# Patient Record
Sex: FEMALE | Race: WHITE | Employment: FULL TIME | URBAN - METROPOLITAN AREA
[De-identification: names, ages, dates, MRNs, and addresses within clinical notes are randomized per-mention and may not be internally consistent; named-entity substitution may affect disease eponyms.]

---

## 2018-11-16 ENCOUNTER — OFFICE VISIT (OUTPATIENT)
Dept: URGENT CARE | Facility: URGENT CARE | Age: 23
End: 2018-11-16
Payer: COMMERCIAL

## 2018-11-16 VITALS
OXYGEN SATURATION: 98 % | SYSTOLIC BLOOD PRESSURE: 110 MMHG | DIASTOLIC BLOOD PRESSURE: 60 MMHG | BODY MASS INDEX: 35.25 KG/M2 | HEART RATE: 85 BPM | RESPIRATION RATE: 16 BRPM | WEIGHT: 238 LBS | HEIGHT: 69 IN | TEMPERATURE: 98.1 F

## 2018-11-16 DIAGNOSIS — M79.89 FINGER SWELLING: Primary | ICD-10-CM

## 2018-11-16 DIAGNOSIS — M79.645 PAIN IN FINGER OF BOTH HANDS: ICD-10-CM

## 2018-11-16 DIAGNOSIS — M79.644 PAIN IN FINGER OF BOTH HANDS: ICD-10-CM

## 2018-11-16 LAB
ALBUMIN SERPL-MCNC: 3.5 G/DL (ref 3.4–5)
ALP SERPL-CCNC: 62 U/L (ref 40–150)
ALT SERPL W P-5'-P-CCNC: 23 U/L (ref 0–50)
ANION GAP SERPL CALCULATED.3IONS-SCNC: 10 MMOL/L (ref 3–14)
AST SERPL W P-5'-P-CCNC: 10 U/L (ref 0–45)
BILIRUB SERPL-MCNC: 0.3 MG/DL (ref 0.2–1.3)
BUN SERPL-MCNC: 18 MG/DL (ref 7–30)
CALCIUM SERPL-MCNC: 8.6 MG/DL (ref 8.5–10.1)
CHLORIDE SERPL-SCNC: 109 MMOL/L (ref 94–109)
CO2 SERPL-SCNC: 20 MMOL/L (ref 20–32)
CREAT SERPL-MCNC: 0.79 MG/DL (ref 0.52–1.04)
ERYTHROCYTE [DISTWIDTH] IN BLOOD BY AUTOMATED COUNT: 12.8 % (ref 10–15)
ERYTHROCYTE [SEDIMENTATION RATE] IN BLOOD BY WESTERGREN METHOD: 9 MM/H (ref 0–20)
GFR SERPL CREATININE-BSD FRML MDRD: 89 ML/MIN/1.7M2
GLUCOSE SERPL-MCNC: 99 MG/DL (ref 70–99)
HCT VFR BLD AUTO: 39.4 % (ref 35–47)
HGB BLD-MCNC: 13.2 G/DL (ref 11.7–15.7)
MCH RBC QN AUTO: 29.6 PG (ref 26.5–33)
MCHC RBC AUTO-ENTMCNC: 33.5 G/DL (ref 31.5–36.5)
MCV RBC AUTO: 88 FL (ref 78–100)
PLATELET # BLD AUTO: 335 10E9/L (ref 150–450)
POTASSIUM SERPL-SCNC: 3.9 MMOL/L (ref 3.4–5.3)
PROT SERPL-MCNC: 7.4 G/DL (ref 6.8–8.8)
RBC # BLD AUTO: 4.46 10E12/L (ref 3.8–5.2)
SODIUM SERPL-SCNC: 139 MMOL/L (ref 133–144)
TSH SERPL DL<=0.005 MIU/L-ACNC: 1.51 MU/L (ref 0.4–4)
WBC # BLD AUTO: 6.5 10E9/L (ref 4–11)

## 2018-11-16 PROCEDURE — 86431 RHEUMATOID FACTOR QUANT: CPT | Performed by: FAMILY MEDICINE

## 2018-11-16 PROCEDURE — 86038 ANTINUCLEAR ANTIBODIES: CPT | Performed by: FAMILY MEDICINE

## 2018-11-16 PROCEDURE — 99203 OFFICE O/P NEW LOW 30 MIN: CPT | Performed by: FAMILY MEDICINE

## 2018-11-16 PROCEDURE — 86039 ANTINUCLEAR ANTIBODIES (ANA): CPT | Performed by: FAMILY MEDICINE

## 2018-11-16 PROCEDURE — 84443 ASSAY THYROID STIM HORMONE: CPT | Performed by: FAMILY MEDICINE

## 2018-11-16 PROCEDURE — 85027 COMPLETE CBC AUTOMATED: CPT | Performed by: FAMILY MEDICINE

## 2018-11-16 PROCEDURE — 80053 COMPREHEN METABOLIC PANEL: CPT | Performed by: FAMILY MEDICINE

## 2018-11-16 PROCEDURE — 36415 COLL VENOUS BLD VENIPUNCTURE: CPT | Performed by: FAMILY MEDICINE

## 2018-11-16 PROCEDURE — 85652 RBC SED RATE AUTOMATED: CPT | Performed by: FAMILY MEDICINE

## 2018-11-16 NOTE — MR AVS SNAPSHOT
"              After Visit Summary   2018    Linda Flores    MRN: [id number]           Patient Information     Date Of Birth          1995        Visit Information        Provider Department      2018 11:20 AM Yessi Ramos MD [facility]        Today's Diagnoses     Finger swelling    -  1    Pain in finger of both hands           Follow-ups after your visit        Who to contact     If you have questions or need follow up information about today's clinic visit or your schedule please contact [facility] directly at [phone].  Normal or non-critical lab and imaging results will be communicated to you by MyChart, letter or phone within 4 business days after the clinic has received the results. If you do not hear from us within 7 days, please contact the clinic through Coverhart or phone. If you have a critical or abnormal lab result, we will notify you by phone as soon as possible.  Submit refill requests through Elephanti or call your pharmacy and they will forward the refill request to us. Please allow 3 business days for your refill to be completed.          Additional Information About Your Visit        MyChart Information     Elephanti lets you send messages to your doctor, view your test results, renew your prescriptions, schedule appointments and more. To sign up, go to [email or web address] . Click on \"Log in\" on the left side of the screen, which will take you to the Welcome page. Then click on \"Sign up Now\" on the right side of the page.     You will be asked to enter the access code listed below, as well as some personal information. Please follow the directions to create your username and password.     Your access code is: [email or web address]  Expires: 2019 12:59 PM     Your access code will  in 90 days. If you need help or a new code, please call your [facility] clinic or [phone].        Care EveryWhere ID     This is " "your Care EveryWhere ID. This could be used by other organizations to access your [facility] medical records  [id number]        Your Vitals Were     Pulse Temperature Respirations Height Pulse Oximetry BMI (Body Mass Index)    85 98.1  F (36.7  C) 16 5' 9\" (1.753 m) 98% 35.15 kg/m2       Blood Pressure from Last 3 Encounters:   11/16/18 110/60    Weight from Last 3 Encounters:   11/16/18 238 lb (108 kg)              We Performed the Following     Anti Nuclear Yamini IgG by IFA with Reflex     CBC with platelets     Comprehensive metabolic panel     Erythrocyte sedimentation rate auto     Rheumatoid factor     TSH with free T4 reflex        Primary Care Provider Fax #    Physician No Ref-Primary [phone]       No address on file        Equal Access to Services     ROBERTO CARLOS JIN : Hadii homero Evans, waандрейda soheilaqadaha, qaybta kaalmada adejakeyada, teresa canseco . So [facility] [phone].    ATENCIÓN: Si habla español, tiene a winston disposición servicios gratuitos de asistencia lingüística. Llame al [phone].    We comply with applicable federal civil rights laws and [place] laws. We do not discriminate on the basis of race, color, national origin, age, disability, sex, sexual orientation, or gender identity.            Thank you!     Thank you for choosing [facility]  for your care. Our goal is always to provide you with excellent care. Hearing back from our patients is one way we can continue to improve our services. Please take a few minutes to complete the written survey that you may receive in the mail after your visit with us. Thank you!             Your Updated Medication List - Protect others around you: Learn how to safely use, store and throw away your medicines at www.disposemymeds.org.          This list is accurate as of 11/16/18 12:59 PM.  Always use your most recent med list.                   Brand Name Dispense Instructions for use " Diagnosis    UNABLE TO FIND      MEDICATION NAME: migraine medications prn        UNABLE TO FIND      MEDICATION NAME: birth control obinna

## 2018-11-16 NOTE — PROGRESS NOTES
"Chief Complaint   Patient presents with     Musculoskeletal Problem     swelling in fingers and painful, tried ibuprofen and ice, ongoing off and on for the past week     SUBJECTIVE:   Linda Flores is a 23 year old female presenting with a chief complaint of bilateral hands finger swelling with pain on an off for last 1 weeks   Last episode lasted for a day   Yesterday morning woke up with it and has been noticing worsening pain and working makes the pain worse   Pain worsens with movement of fingers   Pain is over the distal phalanges , she works at the [employer] in [employer] . She is a new patient of [facility].  Onset of symptoms was 1 day(s) ago.  Course of illness is waxing and waning.    Severity moderate  Current and Associated symptoms: finger pain and swelling in distal phalanges   Treatment measures tried include None tried.  Predisposing factors include None.    History reviewed. No pertinent past medical history.  Current Outpatient Prescriptions   Medication Sig Dispense Refill     UNABLE TO FIND MEDICATION NAME: migraine medications prn       UNABLE TO FIND MEDICATION NAME: birth control luttera       Social History   Substance Use Topics     Smoking status: Not on file     Smokeless tobacco: Not on file     Alcohol use Not on file     History reviewed. No pertinent family history.      ROS:    10 point ROS of systems including Constitutional, Eyes, Respiratory, Cardiovascular, Gastroenterology, Genitourinary, Integumentary,  Psychiatric were all negative except for pertinent positives noted in my HPI         OBJECTIVE:  /60  Pulse 85  Temp 98.1  F (36.7  C)  Resp 16  Ht 5' 9\" (1.753 m)  Wt 238 lb (108 kg)  SpO2 98%  BMI 35.15 kg/m2  GENERAL APPEARANCE: healthy, alert and no distress  EYES: EOMI,  PERRL, conjunctiva clear  HENT: ear canals and TM's normal.  Nose and mouth without ulcers, erythema or lesions  NECK: supple, nontender, no lymphadenopathy  RESP: lungs clear to auscultation - " no rales, rhonchi or wheezes  CV: regular rates and rhythm, normal S1 S2, no murmur noted  MSK- no wrist pain , there is slight tenderness in all the dsital phalanges of the hand except the thumb   There was no obvious finger swelling noted , no warmth or redness noted   ABDOMEN:  soft, nontender, no HSM or masses and bowel sounds normal  SKIN: no suspicious lesions or rashes  Physical Exam      X-Ray was not done.      ASSESSMENT:  Linda was seen today for musculoskeletal problem.    Diagnoses and all orders for this visit:    Finger swelling  -     Comprehensive metabolic panel  -     Anti Nuclear Yamini IgG by IFA with Reflex  -     Erythrocyte sedimentation rate auto  -     CBC with platelets  -     TSH with free T4 reflex    Pain in finger of both hands  -     TSH with free T4 reflex  -     Rheumatoid factor          PLAN:  Ibuprofen and Rest  Discussed with pt as no redness there is no infection   Could be viral myalgia or autoimmune arthritis   Lab tests  pending   F/u pcp if symptoms persist   See orders in Epic  Follow up if  symptoms fail to improve or worsens   Pt understood and agreed with plan       Yessi Ramos MD

## 2018-11-16 NOTE — LETTER
[facility]  [address] [address]  Phone: [phone]    11/16/18    Linda Flores  [address] [facility] [address]      To whom it may concern:     Linda Flores was seen in the clinic for current illness   She needs to be off from work today and tomorrow, if pain persists should follow up with PCP.      Sincerely,      Yessi Ramos MD

## 2018-11-18 LAB — RHEUMATOID FACT SER NEPH-ACNC: <20 IU/ML (ref 0–20)

## 2018-11-19 LAB
ANA PAT SER IF-IMP: ABNORMAL
ANA SER QL IF: POSITIVE
ANA TITR SER IF: ABNORMAL {TITER}

## 2019-12-29 ENCOUNTER — HOSPITAL ENCOUNTER (EMERGENCY)
Facility: CLINIC | Age: 24
Discharge: HOME | End: 2019-12-29
Attending: EMERGENCY MEDICINE | Admitting: EMERGENCY MEDICINE
Payer: COMMERCIAL

## 2019-12-29 ENCOUNTER — APPOINTMENT (OUTPATIENT)
Dept: GENERAL RADIOLOGY | Facility: CLINIC | Age: 24
End: 2019-12-29
Attending: EMERGENCY MEDICINE
Payer: COMMERCIAL

## 2019-12-29 VITALS
WEIGHT: 245 LBS | SYSTOLIC BLOOD PRESSURE: 131 MMHG | BODY MASS INDEX: 36.29 KG/M2 | OXYGEN SATURATION: 99 % | RESPIRATION RATE: 18 BRPM | TEMPERATURE: 97.9 F | DIASTOLIC BLOOD PRESSURE: 76 MMHG | HEIGHT: 69 IN

## 2019-12-29 DIAGNOSIS — J06.9 UPPER RESPIRATORY TRACT INFECTION, UNSPECIFIED TYPE: ICD-10-CM

## 2019-12-29 LAB — INTERPRETATION ECG - MUSE: NORMAL

## 2019-12-29 PROCEDURE — 99284 EMERGENCY DEPT VISIT MOD MDM: CPT | Mod: 25

## 2019-12-29 PROCEDURE — 93005 ELECTROCARDIOGRAM TRACING: CPT

## 2019-12-29 PROCEDURE — 71046 X-RAY EXAM CHEST 2 VIEWS: CPT

## 2019-12-29 RX ORDER — OSELTAMIVIR PHOSPHATE 75 MG/1
75 CAPSULE ORAL 2 TIMES DAILY
Qty: 10 CAPSULE | Refills: 0 | Status: SHIPPED | OUTPATIENT
Start: 2019-12-29 | End: 2020-01-03

## 2019-12-29 ASSESSMENT — ENCOUNTER SYMPTOMS
VOMITING: 0
FEVER: 0
CHILLS: 1
FATIGUE: 1
SHORTNESS OF BREATH: 1
RHINORRHEA: 1
NAUSEA: 0
DIAPHORESIS: 1

## 2019-12-29 ASSESSMENT — MIFFLIN-ST. JEOR: SCORE: 1920.69

## 2019-12-29 NOTE — ED PROVIDER NOTES
"  History     Chief Complaint:  Shortness of breath     HPI  Linda Flores is a 24 year old female who presents with shortness of breath. The patient has has noticed shortness of breath, \"shallow breaths\", and pain with deep inhalation since this morning. She also notes multiple ill contacts at her work as of late. She called in sick today as she has also had fatigue, chills, myalgias, diaphoresis, rhinorrhea, and sneezing. Here, she noticed increased shortness of breath upon walking from the ED entrance to her assigned hospital room. She denies any fever, vomiting, nausea, leg pain, leg swelling, or recent travel.     CARDIAC RISK FACTORS:  Sex:    Female   Tobacco:   Negative   Hypertension:   Negative   Hyperlipidemia:  Negative   Diabetes:   Negative   Family History:  Negative     PE/DVT RISK FACTORS:  Sex:    Female   Hormones:   On OCP, unclear which one   Tobacco:   Negative   Cancer:   Negative   Travel:   Negative   Surgery:   Negative   Other immobilization: Negative   Personal history:  Negative   Family history:  Negative     Allergies:  Sulfa drugs    Medications:    Maxalt   Aviane   Meloxicam   Lutera   OrthoTri-Cyclen    Past Medical History:    Migraine without aura   Obesity   Dysmenorrhea    Past Surgical History:    [place] teeth extraction   Tympanostomy tube placement   Adenoidectomy     Family History:    Hypertension   Migraines   Obesity   Pulmonary embolism    Alzheimer's disease     Social History:  Marital Status:  Unknown   Smoker:   Negative   Smokeless:   Negative   Alcohol:   Positive   Drugs:   Unknown   Arrives with:   Roommate     Review of Systems   Constitutional: Positive for chills, diaphoresis and fatigue. Negative for fever.   HENT: Positive for rhinorrhea and sneezing.    Respiratory: Positive for shortness of breath.    Cardiovascular: Negative for leg swelling.   Gastrointestinal: Negative for nausea and vomiting.   All other systems reviewed and are " "negative.    Physical Exam     Patient Vitals for the past 24 hrs:   BP Temp Temp src Heart Rate Resp SpO2 Height Weight   12/29/19 1806 131/76 -- -- -- 18 99 % -- --   12/29/19 1619 (!) 150/67 97.9  F (36.6  C) Oral 66 18 99 % 1.753 m (5' 9\") 111.1 kg (245 lb)     Physical Exam  General: Sitting up in bed, wearing mask  Eyes:  The pupils are equal and round    Conjunctivae and sclerae are normal  ENT:    Moist mucous membranes  Neck:  Normal range of motion  CV:  Regular rate and rhythm    Skin warm and well perfused   Resp:  Lungs are clear    Non-labored    No rales    No wheezing   GI:  Abdomen is soft, there is no rigidity    No distension    No rebound tenderness     No abdominal tenderness  MS:  Normal muscular tone  Skin:  No rash or acute skin lesions noted  Neuro:   Awake, alert.      Speech is normal and fluent.    Face is symmetric.     Moves all extremities equally  Psych: Normal affect.  Appropriate interactions.    Emergency Department Course   ECG:  Indication: shortness of breath   Time: 1709  Vent. Rate 68 bpm. AK interval 128. QRS duration 92. QT/QTc 404/429. P-R-T axis 58 48 37. Normal sinus rhythm. Normal ECG. Read time: 1711    Imaging:  Radiographic findings were communicated with the patient and friend who voiced understanding of the findings.    XR Chest 2 views:   No acute airspace disease. As per radiology.     Emergency Department Course:  1701 I performed an exam of the patient as documented above.   1802 I rechecked the patient and discussed the results of their workup thus far.     Findings and plan explained. Patient discharged home with instructions regarding supportive care, medications, and reasons to return. The importance of close follow-up was reviewed. I personally reviewed the workup results with them and answered all related questions prior to discharge.    Impression & Plan    Medical Decision Making:  Linda Flores is a 24 year old female who presents with symptoms " consistent with URI. The patient appears well and nontoxic. There is no clinical evidence of dehydration. The patient has normal oxygen saturations with normal work of breathing.  Chest xray clear. EKG unremarkable. The patient has no significant underlying comorbid cardiopulmonary process. Symptoms consistent with likely viral illness possible influenza like illness. I discussed Tamiflu and testing for influenza. Declines testing but would like to be treated for influenza in case this is Influenza as the patient is intending on seeing her dad who has MS soon and would not like to risk getting him sick. Doubt PE, ACS, cardiomyopathy, pericarditis, CHF, etc. Primary clinic follow up in 1 week is recommended for persistent symptoms.     Diagnosis:    ICD-10-CM    1. Upper respiratory tract infection, unspecified type J06.9      Disposition:  Discharged home with Tamiflu.    Discharge Medications:  Discharge Medication List as of 12/29/2019  6:08 PM      START taking these medications    Details   oseltamivir (TAMIFLU) 75 MG capsule Take 1 capsule (75 mg) by mouth 2 times daily for 5 days, Disp-10 capsule, R-0, Local Print           Scribe Disclosure: I, Chato Yao, am serving as a scribe on 12/29/2019 at 5:01 PM to personally document services performed by Kenzie Navarro MD based on my observations and the provider's statements to me.      Kenzie Navarro MD  12/30/19 0131

## 2019-12-29 NOTE — ED AVS SNAPSHOT
Emergency Department  [address] [place] [address]  Phone:  [phone]  Fax:  [phone]                                    Linda Flores   MRN: [id number]    Department:   Emergency Department   Date of Visit:  12/29/2019           After Visit Summary Signature Page    I have received my discharge instructions, and my questions have been answered. I have discussed any challenges I see with this plan with the nurse or doctor.    ..........................................................................................................................................  Patient/Patient Representative Signature      ..........................................................................................................................................  Patient Representative Print Name and Relationship to Patient    ..................................................               ................................................  Date                                   Time    ..........................................................................................................................................  Reviewed by Signature/Title    ...................................................              ..............................................  Date                                               Time          22EPIC Rev 08/18

## 2019-12-29 NOTE — ED TRIAGE NOTES
Started this morning, woke up with shallow breathing.  Called into work and went back to sleep.  Woke up again and patient stating that she was shivering and still had SOB.  States she has chills, runny nose, sneezing.  No influenza vaccine this year.

## 2019-12-30 NOTE — DISCHARGE INSTRUCTIONS
Start the tamiflu in case this is the start of flu especially with you going to see your family soon  Ibuprofen and tylenol for discomfort/chills as needed

## 2020-03-11 ENCOUNTER — HEALTH MAINTENANCE LETTER (OUTPATIENT)
Age: 25
End: 2020-03-11

## 2021-01-03 ENCOUNTER — HEALTH MAINTENANCE LETTER (OUTPATIENT)
Age: 26
End: 2021-01-03

## 2021-04-25 ENCOUNTER — HEALTH MAINTENANCE LETTER (OUTPATIENT)
Age: 26
End: 2021-04-25

## 2021-10-10 ENCOUNTER — HEALTH MAINTENANCE LETTER (OUTPATIENT)
Age: 26
End: 2021-10-10

## 2022-05-21 ENCOUNTER — HEALTH MAINTENANCE LETTER (OUTPATIENT)
Age: 27
End: 2022-05-21

## 2022-09-18 ENCOUNTER — HEALTH MAINTENANCE LETTER (OUTPATIENT)
Age: 27
End: 2022-09-18

## 2023-06-04 ENCOUNTER — HEALTH MAINTENANCE LETTER (OUTPATIENT)
Age: 28
End: 2023-06-04